# Patient Record
Sex: FEMALE | Race: BLACK OR AFRICAN AMERICAN | Employment: UNEMPLOYED | URBAN - METROPOLITAN AREA
[De-identification: names, ages, dates, MRNs, and addresses within clinical notes are randomized per-mention and may not be internally consistent; named-entity substitution may affect disease eponyms.]

---
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
## 2025-01-01 ENCOUNTER — HOSPITAL ENCOUNTER (EMERGENCY)
Facility: HOSPITAL | Age: 20
Discharge: HOME | End: 2025-01-01
Attending: EMERGENCY MEDICINE
Payer: MEDICAID

## 2025-01-01 VITALS
BODY MASS INDEX: 21.16 KG/M2 | HEART RATE: 104 BPM | WEIGHT: 115 LBS | SYSTOLIC BLOOD PRESSURE: 119 MMHG | HEIGHT: 62 IN | OXYGEN SATURATION: 99 % | RESPIRATION RATE: 18 BRPM | TEMPERATURE: 98.6 F | DIASTOLIC BLOOD PRESSURE: 81 MMHG

## 2025-01-01 DIAGNOSIS — B97.89 VIRAL RESPIRATORY ILLNESS: Primary | ICD-10-CM

## 2025-01-01 DIAGNOSIS — J98.8 VIRAL RESPIRATORY ILLNESS: Primary | ICD-10-CM

## 2025-01-01 LAB
FLUAV RNA SPEC QL NAA+PROBE: NOT DETECTED
FLUBV RNA SPEC QL NAA+PROBE: NOT DETECTED
S PYO DNA THROAT QL NAA+PROBE: NOT DETECTED
SARS-COV-2 RNA RESP QL NAA+PROBE: NOT DETECTED
SOURCE: NORMAL

## 2025-01-01 PROCEDURE — 87651 STREP A DNA AMP PROBE: CPT

## 2025-01-01 PROCEDURE — 87636 SARSCOV2 & INF A&B AMP PRB: CPT

## 2025-01-01 PROCEDURE — 99283 EMERGENCY DEPT VISIT LOW MDM: CPT

## 2025-01-01 NOTE — ED TRIAGE NOTES
To ED with c/o cough, body aches, headache, nausea, feels hot, but unsure if has fever.  Onset a few days ago.    Has taken ibuprofen & DayQuil, but symptoms recur.

## 2025-01-01 NOTE — ED PROVIDER NOTES
note:    No orders to display        LABS:  Labs Reviewed   COVID-19 & INFLUENZA COMBO   STREP A, PCR       All other labs were within normal range or not returned as of this dictation.    EMERGENCY DEPARTMENT COURSE and DIFFERENTIAL DIAGNOSIS/MDM:   Vitals:    Vitals:    01/01/25 1355   BP: 119/81   Pulse: (!) 104   Resp: 18   Temp: 98.6 °F (37 °C)   TempSrc: Oral   SpO2: 99%   Weight: 52.2 kg (115 lb)   Height: 1.575 m (5' 2\")           Medical Decision Making  Amount and/or Complexity of Data Reviewed  Labs: ordered.            REASSESSMENT      Progress Note:  Results, treatment, and follow up plan have been discussed with patient joel plascencia I will see.  Questions were answered.         CONSULTS:  None    PROCEDURES:  Unless otherwise noted below, none     Procedures      FINAL IMPRESSION      1. Viral respiratory illness      Assessment/plan: Healthy 19-year-old who presents with a 4-day history of cough, body aches, headache, nausea, subjective fever, sore throat, nasal congestion.  Differential diagnosis includes viral respiratory illness (which I suspect), pneumonia, sepsis, and others.  Reassuring appearance/exam with stable vital signs.  Strep, flu and COVID are negative.  PCP follow-up as needed.  Return precautions.  Randall Guerrero MD  2:41 PM      DISPOSITION/PLAN   DISPOSITION Decision To Discharge 01/01/2025 02:35:22 PM      PATIENT REFERRED TO:  Daniela Patel MD  [address]  [address]  [facility] [address] [phone]      As needed    [facility] [place] [place]  [address]  [facility] [address] [phone]    If symptoms worsen.  Rest, fluids, Tylenol and/or ibuprofen.      DISCHARGE MEDICATIONS:  New Prescriptions    No medications on file         (Please note that portions of this note were completed with a voice recognition program.  Efforts were made to edit the dictations but occasionally words are mis-transcribed.)    Randall Guerrero MD (electronically signed)  Emergency Attending

## 2025-01-31 ENCOUNTER — APPOINTMENT (OUTPATIENT)
Facility: HOSPITAL | Age: 20
End: 2025-01-31
Payer: MEDICAID

## 2025-01-31 ENCOUNTER — HOSPITAL ENCOUNTER (EMERGENCY)
Facility: HOSPITAL | Age: 20
Discharge: HOME | End: 2025-01-31
Attending: EMERGENCY MEDICINE
Payer: MEDICAID

## 2025-01-31 VITALS
HEART RATE: 86 BPM | OXYGEN SATURATION: 100 % | RESPIRATION RATE: 18 BRPM | SYSTOLIC BLOOD PRESSURE: 111 MMHG | DIASTOLIC BLOOD PRESSURE: 90 MMHG | WEIGHT: 115 LBS | BODY MASS INDEX: 21.16 KG/M2 | HEIGHT: 62 IN | TEMPERATURE: 98.2 F

## 2025-01-31 DIAGNOSIS — S69.91XA INJURY OF RIGHT HAND, INITIAL ENCOUNTER: Primary | ICD-10-CM

## 2025-01-31 PROCEDURE — 73130 X-RAY EXAM OF HAND: CPT

## 2025-01-31 PROCEDURE — 6370000000 HC RX 637 (ALT 250 FOR IP): Performed by: PHYSICIAN ASSISTANT

## 2025-01-31 PROCEDURE — 99283 EMERGENCY DEPT VISIT LOW MDM: CPT

## 2025-01-31 RX ORDER — IBUPROFEN 400 MG/1
400 TABLET, FILM COATED ORAL
Status: COMPLETED | OUTPATIENT
Start: 2025-01-31 | End: 2025-01-31

## 2025-01-31 RX ADMIN — IBUPROFEN 400 MG: 400 TABLET, FILM COATED ORAL at 22:57

## 2025-01-31 ASSESSMENT — PAIN DESCRIPTION - LOCATION: LOCATION: FINGER (COMMENT WHICH ONE)

## 2025-01-31 ASSESSMENT — PAIN SCALES - GENERAL: PAINLEVEL_OUTOF10: 10

## 2025-01-31 ASSESSMENT — PAIN - FUNCTIONAL ASSESSMENT: PAIN_FUNCTIONAL_ASSESSMENT: 0-10

## 2025-02-01 NOTE — ED TRIAGE NOTES
Pt ambulatory to ED w/ c/o right middle finger injury on metal cup in her car PTA. Pt endorses her hand slamming into cup and breaking her nail. Pt endorses pain on movement and pain is worse at the tip of finger.    Bleeding controlled in triage. Swelling noted.

## 2025-02-01 NOTE — ED NOTES
Pt given discharge instructions, pt education, prescriptions and follow up information. Pt verbalizes understanding. All questions answered. Pt discharged to home in private vehicle, ambulatory. Pt A&O x4, RA, pain controlled.

## 2025-02-01 NOTE — ED PROVIDER NOTES
[facility]  EMERGENCY DEPARTMENT ENCOUNTER      Pt Name: Nora James  MRN: [id number]  Birthdate 2005  Date of evaluation: 1/31/2025  Provider: Kenney Meng PA-C    CHIEF COMPLAINT       Chief Complaint   Patient presents with    Hand Injury         HISTORY OF PRESENT ILLNESS   (Location/Symptom, Timing/Onset, Context/Setting, Quality, Duration, Modifying Factors, Severity)  Note limiting factors.   This is a healthy 19-year-old female who reported to the ED after slamming her hand and getting her nail of her right middle finger caught in her metal cup in her car.  She has mild swelling and moderate pain.  Otherwise denies complaints and injury.            Review of External Medical Records:     Nursing Notes were reviewed.    REVIEW OF SYSTEMS    (2-9 systems for level 4, 10 or more for level 5)     Review of Systems    Except as noted above the remainder of the review of systems was reviewed and negative.       PAST MEDICAL HISTORY     Past Medical History:   Diagnosis Date    Constipation     Scoliosis          SURGICAL HISTORY     No past surgical history on file.      CURRENT MEDICATIONS       Previous Medications    ALBUTEROL SULFATE HFA (VENTOLIN HFA) 108 (90 BASE) MCG/ACT INHALER    Inhale 2 puffs into the lungs 4 times daily as needed for Wheezing    FLUTICASONE (FLONASE) 50 MCG/ACT NASAL SPRAY    1 spray by Nasal route daily for 10 days    SERTRALINE (ZOLOFT) 50 MG TABLET    Take 1 tablet by mouth daily       ALLERGIES     Seasonal    FAMILY HISTORY       Family History   Problem Relation Age of Onset    No Known Problems Father     No Known Problems Mother           SOCIAL HISTORY       Social History     Socioeconomic History    Marital status: Single   Tobacco Use    Smoking status: Never    Smokeless tobacco: Never   Vaping Use    Vaping status: Never Used   Substance and Sexual Activity    Alcohol use: Never    Drug use: Never           PHYSICAL EXAM    (up to 7